# Patient Record
Sex: MALE | Race: WHITE | HISPANIC OR LATINO | URBAN - METROPOLITAN AREA
[De-identification: names, ages, dates, MRNs, and addresses within clinical notes are randomized per-mention and may not be internally consistent; named-entity substitution may affect disease eponyms.]

---

## 2022-07-13 ENCOUNTER — OUTPATIENT (OUTPATIENT)
Dept: OUTPATIENT SERVICES | Facility: HOSPITAL | Age: 27
LOS: 1 days | End: 2022-07-13
Payer: SELF-PAY

## 2022-07-13 DIAGNOSIS — Z20.828 CONTACT WITH AND (SUSPECTED) EXPOSURE TO OTHER VIRAL COMMUNICABLE DISEASES: ICD-10-CM

## 2022-07-13 PROCEDURE — 87635 SARS-COV-2 COVID-19 AMP PRB: CPT

## 2022-07-13 PROCEDURE — C9803: CPT

## 2022-07-14 LAB — SARS-COV-2 RNA SPEC QL NAA+PROBE: SIGNIFICANT CHANGE UP

## 2023-07-07 ENCOUNTER — EMERGENCY (EMERGENCY)
Facility: HOSPITAL | Age: 28
LOS: 1 days | Discharge: HOME | End: 2023-07-07
Attending: EMERGENCY MEDICINE
Payer: SELF-PAY

## 2023-07-07 VITALS
RESPIRATION RATE: 20 BRPM | OXYGEN SATURATION: 99 % | WEIGHT: 240.08 LBS | DIASTOLIC BLOOD PRESSURE: 90 MMHG | SYSTOLIC BLOOD PRESSURE: 163 MMHG | HEART RATE: 120 BPM | HEIGHT: 68 IN | TEMPERATURE: 98 F

## 2023-07-07 PROCEDURE — 90715 TDAP VACCINE 7 YRS/> IM: CPT

## 2023-07-07 PROCEDURE — 99284 EMERGENCY DEPT VISIT MOD MDM: CPT | Mod: 25

## 2023-07-07 PROCEDURE — 12001 RPR S/N/AX/GEN/TRNK 2.5CM/<: CPT

## 2023-07-07 PROCEDURE — 73130 X-RAY EXAM OF HAND: CPT | Mod: 26,LT

## 2023-07-07 PROCEDURE — 73130 X-RAY EXAM OF HAND: CPT

## 2023-07-07 PROCEDURE — T1013: CPT

## 2023-07-07 PROCEDURE — 96374 THER/PROPH/DIAG INJ IV PUSH: CPT | Mod: XU

## 2023-07-07 PROCEDURE — 90471 IMMUNIZATION ADMIN: CPT

## 2023-07-07 PROCEDURE — 99284 EMERGENCY DEPT VISIT MOD MDM: CPT

## 2023-07-07 RX ORDER — OXYCODONE AND ACETAMINOPHEN 5; 325 MG/1; MG/1
2 TABLET ORAL ONCE
Refills: 0 | Status: DISCONTINUED | OUTPATIENT
Start: 2023-07-07 | End: 2023-07-07

## 2023-07-07 RX ORDER — CEFAZOLIN SODIUM 1 G
2000 VIAL (EA) INJECTION ONCE
Refills: 0 | Status: DISCONTINUED | OUTPATIENT
Start: 2023-07-07 | End: 2023-07-07

## 2023-07-07 RX ORDER — CEPHALEXIN 500 MG
1 CAPSULE ORAL
Refills: 0
Start: 2023-07-07

## 2023-07-07 RX ORDER — CEFAZOLIN SODIUM 1 G
2000 VIAL (EA) INJECTION ONCE
Refills: 0 | Status: COMPLETED | OUTPATIENT
Start: 2023-07-07 | End: 2023-07-07

## 2023-07-07 RX ORDER — TETANUS TOXOID, REDUCED DIPHTHERIA TOXOID AND ACELLULAR PERTUSSIS VACCINE, ADSORBED 5; 2.5; 8; 8; 2.5 [IU]/.5ML; [IU]/.5ML; UG/.5ML; UG/.5ML; UG/.5ML
0.5 SUSPENSION INTRAMUSCULAR ONCE
Refills: 0 | Status: COMPLETED | OUTPATIENT
Start: 2023-07-07 | End: 2023-07-07

## 2023-07-07 RX ORDER — CEPHALEXIN 500 MG
1 CAPSULE ORAL
Qty: 14 | Refills: 0
Start: 2023-07-07 | End: 2023-07-13

## 2023-07-07 RX ADMIN — TETANUS TOXOID, REDUCED DIPHTHERIA TOXOID AND ACELLULAR PERTUSSIS VACCINE, ADSORBED 0.5 MILLILITER(S): 5; 2.5; 8; 8; 2.5 SUSPENSION INTRAMUSCULAR at 19:49

## 2023-07-07 RX ADMIN — OXYCODONE AND ACETAMINOPHEN 2 TABLET(S): 5; 325 TABLET ORAL at 19:48

## 2023-07-07 RX ADMIN — Medication 1 TABLET(S): at 20:48

## 2023-07-07 RX ADMIN — Medication 2000 MILLIGRAM(S): at 23:05

## 2023-07-07 NOTE — ED PROVIDER NOTE - OBJECTIVE STATEMENT
28 year old male with no med hx presented to ED c/o hand injury. pt was cutting with a circular saw, cut his hand and finger. He admits to pain, decrease in ROM to 1st digit. denies other injuries. unknown last tetanus. denies fever/chills, numbness, tingling, n/v/d.

## 2023-07-07 NOTE — ED PROVIDER NOTE - NSFOLLOWUPINSTRUCTIONS_ED_ALL_ED_FT
Follow up with hand within 1 week   - Please follow up with your Primary Care Doctor in 24-48 hr.  - Seek immediate medical care for any new, worsening or concerning signs or symptoms.   - Take medications as directed, be sure to read all instructions on packaging  - You were given copies of all your test results, please bring to your primary care doctor for review of any abnormal test results    Feel better!    Fracture    A fracture is a break in one of your bones. This can occur from a variety of injuries, especially traumatic ones. Symptoms include pain, bruising, or swelling. Do not use the injured limb. If a fracture is in one of the bones below your waist, do not put weight on that limb unless instructed to do so by your healthcare provider. Crutches or a cane may have been provided. A splint or cast may have been applied by your health care provider. Make sure to keep it dry and follow up with an orthopedist as instructed.    SEEK IMMEDIATE MEDICAL CARE IF YOU HAVE ANY OF THE FOLLOWING SYMPTOMS: numbness, tingling, increasing pain, or weakness in any part of the injured limb.    Laceration    A laceration is a cut that goes through all of the layers of the skin and into the tissue that is right under the skin. Some lacerations heal on their own. Others need to be closed with skin adhesive strips, skin glue, stitches (sutures), or staples. Proper laceration care minimizes the risk of infection and helps the laceration to heal better.  If non-absorbable stitches or staples have been placed, they must be taken out within the time frame instructed by your healthcare provider.    SEEK IMMEDIATE MEDICAL CARE IF YOU HAVE ANY OF THE FOLLOWING SYMPTOMS: swelling around the wound, worsening pain, drainage from the wound, red streaking going away from your wound, inability to move finger or toe near the laceration, or discoloration of skin near the laceration.        Fractura  Anders fractura es anders ruptura en juan f de milo huesos. [place] puede ocurrir por anders variedad de lesiones, especialmente las traumáticas. Los síntomas incluyen dolor, hematomas o hinchazón. No utilice la extremidad lesionada. Si tiene anders fractura en juan f de los huesos debajo de la cintura, no ponga peso sobre adelfo extremidad a menos que hernandez proveedor de atención médica se lo indique. Es posible que se hayan proporcionado muletas o un bastón. Es posible que hernandez proveedor de atención médica le haya aplicado anders férula o un yeso. Asegúrese de mantenerlo seco y pearl un seguimiento con un ortopedista según las instrucciones.  BUSQUE ATENCIÓN MÉDICA INMEDIATA SI TIENE ALGUNO DE LOS SIGUIENTES SÍNTOMAS: entumecimiento, hormigueo, aumento del dolor o debilidad en cualquier parte de la extremidad lesionada.  Laceración  Anders laceración es un mono que atraviesa todas las capas de la piel y llega al tejido que está jocelyne debajo de la piel. Algunas laceraciones se curan solas. Otros deben cerrarse con tiras adhesivas para la piel, pegamento para la piel, puntos (suturas) o grapas. El cuidado adecuado de la laceración minimiza el riesgo de infección y ayuda a que la laceración sane mejor. Si se murray colocado puntos o grapas no absorbibles, se deben retirar dentro del plazo indicado por hernandez proveedor de atención médica.  BUSQUE ATENCIÓN MÉDICA INMEDIATA SI TIENE ALGUNO DE LOS SIGUIENTES SÍNTOMAS: hinchazón alrededor de la herida, empeoramiento del dolor, drenaje de la herida, vetas aggarwal que desaparecen de la herida, incapacidad para  el dedo de la mano o del pie cerca de la laceración, o decoloración de la piel cerca de la herida. laceración.

## 2023-07-07 NOTE — ED PROVIDER NOTE - PROGRESS NOTE DETAILS
ortho called for + open fracture and extensive deep laceration with nerve injury - gary monroy repaired palm laceration 10 interrupted sutures - gary monroy

## 2023-07-07 NOTE — ED PROVIDER NOTE - CARE PROVIDER_API CALL
Karla Knowles  Orthopaedic Surgery  [address] [place] [address]  Phone: [phone]  Fax: [phone]  Follow Up Time:

## 2023-07-07 NOTE — ED ADULT NURSE NOTE - OBJECTIVE STATEMENT
Pt A&OX4.  PT able to ambulate.  PT stated that he was at work and cut his hand.  Pt noted with a laceration to the left thumb.  Bleeding controlled. PT medicated for pain as per orders.  Will continue to monitor.

## 2023-07-07 NOTE — ED PROVIDER NOTE - PATIENT PORTAL LINK FT
You can access the FollowMyHealth Patient Portal offered by [facility] by registering at the following website: [email or web address]. By joining [facility]’s FollowMyHealth portal, you will also be able to view your health information using other applications (apps) compatible with our system.

## 2023-07-07 NOTE — ED PROVIDER NOTE - CLINICAL SUMMARY MEDICAL DECISION MAKING FREE TEXT BOX
28 year old male with no med hx presented to ED c/o hand injury. pt with hand laceration x 2 with deformity to 1st left digit, + decrease in sensation to distal phalanx. xray, lac repair, abx and pain control     ortho called for open fracture,

## 2023-07-07 NOTE — ED ADULT TRIAGE NOTE - INTERNATIONAL TRAVEL
JAMES OU:  PRESCRIBED UV PROTECTION TO SLOW GROWTH. PRESCRIBE ARTIFICAL TEARS TO INCREASE COMFORT. No

## 2023-07-07 NOTE — ED PROVIDER NOTE - NS ED ROS FT
Constitutional: no fevers, chills  HEENT: no visual changes, no sore throat, no rhinorrhea  CV: no cp  Resp: no sob  GI: no abd pain, n/v, diarrhea/constipation  : no dysuria, hematuria  MSK: + left hand pain   skin: no rashes  neuro: no HA, no confusion  psych: no SI/HI

## 2023-07-07 NOTE — ED PROVIDER NOTE - ATTENDING APP SHARED VISIT CONTRIBUTION OF CARE
Eugenia FORD- 28-year-old male with no medical problems presents with left hand lacerations when he was at work and using a saw.  Patient is right-handed and last tetanus unknown no other injuries    Sister is the , refused professional     Patient is alert well-appearing male, holding his left hand and pain, S1-S2 is normal regular, bilateral clear breath sounds, abdomen is soft, neuro exam alert oriented x3 no focal deficits, skin warm dry, good turgor, Left hand 2 lacerations noted on the ventral aspect 3 cm laceration on the left thumb mid aspect and 3 cm laceration on the ventral hypothenar aspect.  No profuse bleeding or arterial bleeding patient has full range of motion of the left hand and is able to flex his left thumb    Plan to supplement tetanus shot do the x-ray to rule out any acute fractures, will repair the laceration and then keep him on at p.o. antibiotics as it is a dirty wound.

## 2023-07-07 NOTE — ED ADULT NURSE NOTE - NSFALLUNIVINTERV_ED_ALL_ED
Assistance OOB with selected safe patient handling equipment if applicable/Bed/Stretcher in lowest position, wheels locked, appropriate side rails in place/Call bell, personal items and telephone in reach/Instruct patient to call for assistance before getting out of bed/chair/stretcher/Non-slip footwear applied when patient is off stretcher/[place] to call system/Physically safe environment - no spills, clutter or unnecessary equipment/Purposeful proactive rounding/Room/bathroom lighting operational, light cord in reach

## 2023-07-07 NOTE — ED PROVIDER NOTE - PHYSICAL EXAMINATION
Physical Examination:   Gen: NAD, AOx3  Head: NCAT  HEENT: EOMI, oral mucosa moist, normal conjunctiva, neck supple  Lung: no respiratory distress  CV:  Normal perfusion  Abd: soft,  nontender ND  MSK: No edema, + circumferential laceration to left 1st digit, mild decrease strength to distal fingertip. bleeding controlled. + 4mm laceration to palm of hand FROM motor  intact throughout other digits   Neuro: No focal neurologic deficits  Skin: No rash   Psych: normal affect

## 2023-07-08 NOTE — CONSULT NOTE ADULT - SUBJECTIVE AND OBJECTIVE BOX
ORTHO-HAND SERVICE    Pt Name: HALIE HAWTHORNE    MRN: [id number]    ED  was utilized for this encounter    Patient is a 28y Male presenting to the emergency department with a chief complaint of left hand pain. Patient states that he was using a circular saw at work, slipped and cut his left thumb and palm of left hand with the saw. Patient complains of altered sensation to tip of left thumb.       REVIEW OF SYSTEMS    General: Alert, responsive, in NAD    Skin/Breast: No rashes, no pruritis   	  Ophthalmologic: No visual changes. No redness.   	  ENMT:	No discharge. No swelling.    Respiratory and Thorax: No difficulty breathing. No cough.  	   Cardiovascular: No chest pain. No palpitations.    Gastrointestinal: No abdominal pain. No diarrhea.     Genitourinary: No dysuria. No bleeding.    Musculoskeletal: SEE HPI.    Neurological: No sensory or motor changes.     Psychiatric: No anxiety or depression.    Hematology/Lymphatics: No swelling.    Endocrine: No Hx of diabetes.    ROS is otherwise negative.    PAST MEDICAL & SURGICAL HISTORY:  PAST MEDICAL & SURGICAL HISTORY:      Allergies: No Known Allergies      Medications:     FAMILY HISTORY:  : non-contributory    Social History:       Daily Height in cm: 172.72 (07 Jul 2023 18:47)    Daily                     PHYSICAL EXAM:      Appearance: Alert, responsive, in no acute distress.    Neurological: Sensation is grossly intact to light touch. 5/5 motor function of all extremities. No focal deficits or weaknesses found.    Skin: no rash on visible skin. Skin is clean, dry and intact. No bleeding. No abrasions. No ulcerations.    Vascular: 2+ distal pulses. Cap refill < 2 sec. No signs of venous  insufficiency  or stasis. No extremity ulcerations. No cyanosis.    Musculoskeletal:         Left Upper Extremity:       Right Upper Extremity:           Imaging Studies:    A/P:  Pt is a  28y Male with    found to have    PLAN:   * NPO for OR tomorrow  * IV fluids ordered and to start once NPO  * Pre-operative ABX ordered  *Routine daily anticoagulation held for OR  * Single dose anticoaguation ordered  * Medical clearance requested for procedure  * Bed rest ORTHO-HAND SERVICE    Pt Name: HALIE HAWTHORNE    MRN: [id number]    Patient is a 28y Male presenting to the emergency department with a chief complaint of left hand pain. Patient states that he was using a circular saw at work, slipped and cut his left thumb and palm of left hand with the saw. Patient complains of altered sensation to tip of left thumb. Patient states that he was able to move all fingers     REVIEW OF SYSTEMS    General: Alert, responsive, in NAD    Skin/Breast: + laceration to left palm and thumb     Musculoskeletal: SEE HPI.    Neurological: altered sensation to tip of left thumb        Allergies: No Known Allergies      PHYSICAL EXAM:      Appearance: Alert, responsive, in no acute distress.    Neurological: Sensation is grossly intact to light touch. No focal deficits or weaknesses found. Altered sensation to left thumb see below.    Skin: laceration to left palm and left thumb see below for details, otherwise no rash on visible skin. Skin is clean, dry and intact. No bleeding. No abrasions. No ulcerations.    Vascular: 2+ distal pulses. Brisk cap refill to all digits of left hand     Musculoskeletal:         Left Upper Extremity: Left hand - Approximately 4cm laceration on ulnar side of left palm (already sutured by ER team at time of physical exam), Circumferential laceration to distal aspect of left thumb beginning at volar aspect of left thumb distal to IP joint extending radially to middle of volar surface of left thumb just proximal to nail plate. Nail plate intact.              Imaging Studies:    A/P:  Pt is a  28y Male with    found to have    PLAN:                            ORTHO-HAND SERVICE    Pt Name: HALIE HAWTHORNE    MRN: [id number]    Patient is a 28y Male presenting to the emergency department with a chief complaint of left hand pain. Patient states that he was using a circular saw at work, slipped and cut his left thumb and palm of left hand with the saw. Patient complains of altered sensation to tip of left thumb. Patient states that he was able to move all fingers     REVIEW OF SYSTEMS    General: Alert, responsive, in NAD    Skin/Breast: + laceration to left palm and thumb     Musculoskeletal: SEE HPI.    Neurological: altered sensation to tip of left thumb        Allergies: No Known Allergies      PHYSICAL EXAM:      Appearance: Alert, responsive, in no acute distress.    Neurological: Sensation is grossly intact to light touch. No focal deficits or weaknesses found. Altered sensation to left thumb see below.    Skin: laceration to left palm and left thumb see below for details, otherwise no rash on visible skin. Skin is clean, dry and intact. No bleeding. No abrasions. No ulcerations.    Vascular: 2+ distal pulses. Brisk cap refill to all digits of left hand     Musculoskeletal:         Left Upper Extremity: Left hand - Approximately 4cm laceration on ulnar side of left palm (already sutured by ER team at time of physical exam), Full flexion and extension of digits 2-5 left hand, normal sensation to digits 2-5, +2 radial pulse, FROM of left wrist    Left thumb: Circumferential laceration to distal aspect of left thumb beginning at middle portion of volar aspect of left thumb distal to IP joint extending radially to middle of volar surface of left thumb just proximal to nail plate with fat exposed. Nail plate intact. Normal sensation left thumb proximal to IP joint. Minimal sensation to radial aspect of distal portion of left thumb. Ulnar aspect of left thumb distal to IP intact but altered. Flexion and extension intact to distal aspect of thumb although ROM limited due to swelling and pain. +FPL intact on exam. FROM at MP joint of left thumb. Brisk capillary refill to distal aspect of left thumb.      Imaging Studies: XR of left hand reviewed preliminary reveals left thumb distal phalanx fracture     .procedure-I&D    A/P:  Pt is a  28y Male found to have laceration to palm of left hand, laceration to distal portion of left thumb and left thumb distal phalanx fracture. Possible left thumb radial digit nerve injury, no obvious tendon involvement     PLAN:   - Pain control   -                          ORTHO-HAND SERVICE    Pt Name: HALIE HAWTHORNE    MRN: [id number]    Patient is a 28y Male presenting to the emergency department with a chief complaint of left hand pain. Patient states that he was using a circular saw at work, slipped and cut his left thumb and palm of left hand with the saw. Patient complains of altered sensation to tip of left thumb. Patient states that he was able to move all fingers     REVIEW OF SYSTEMS    General: Alert, responsive, in NAD    Skin/Breast: + laceration to left palm and thumb     Musculoskeletal: SEE HPI.    Neurological: altered sensation to tip of left thumb        Allergies: No Known Allergies      PHYSICAL EXAM:      Appearance: Alert, responsive, in no acute distress.    Neurological: Sensation is grossly intact to light touch. No focal deficits or weaknesses found. Altered sensation to left thumb see below.    Skin: laceration to left palm and left thumb see below for details, otherwise no rash on visible skin. Skin is clean, dry and intact. No bleeding. No abrasions. No ulcerations.    Vascular: 2+ distal pulses. Brisk cap refill to all digits of left hand     Musculoskeletal:         Left Upper Extremity: Left hand - Approximately 4cm laceration on ulnar side of left palm (already sutured by ER team at time of physical exam), Full flexion and extension of digits 2-5 left hand, normal sensation to digits 2-5, +2 radial pulse, FROM of left wrist    Left thumb: Circumferential laceration to distal aspect of left thumb beginning at middle portion of volar aspect of left thumb distal to IP joint extending radially to middle of volar surface of left thumb just proximal to nail plate with fat exposed. Nail plate intact. Normal sensation left thumb proximal to IP joint. Minimal sensation to radial aspect of distal portion of left thumb. Ulnar aspect of left thumb distal to IP intact but altered. Flexion and extension intact to distal aspect of thumb although ROM limited due to swelling and pain. +FPL intact on exam. FROM at MP joint of left thumb. Brisk capillary refill to distal aspect of left thumb.      Imaging Studies: XR of left hand reviewed preliminary reveals left thumb distal phalanx fracture     PROCEDURE NOTE: Left thumb incision and drainage/bedside washout, closure of laceration, splint application  ED  was utilized to obtain signed [place] consent    Performed by: Mahin Keating PA-C    Indication: Left thumb laceration with distal phalanx fracture    Consent: the risks and benefits of incision and drainage discussed with patient, including the risk of bleeding, infection, nerve damage, blood vessel damage and technical failure.  The alternatives of performing the procedure also discussed. Written consent was obtained following the discussion. The left thumb was irrigated copiously with betadine and saline.     The affected site was prepped and draped in proper sterile fashion. Left thumb digit nerve block performed with [ 15 ] ml of 1% lidocaine. 5-0 Chromic absorbable sutures used to repair laceration. xeroform placed over repaired laceration site. A dry sterile dressing was applied to site. The site was bandaged and no complications were noted. The patient tolerated the procedure well.    The left thumb was appropriately positioned. A thumb Alumafoam splint applied to left thumb on volar surface. The patient tolerated the procedure well.  Following procedure patient sensation was consistent with original exam seen above. Brisk capillary refill present to tip of left thumb.      A/P:  Pt is a  28y Male found to have laceration to palm of left hand, laceration to distal portion of left thumb and left thumb distal phalanx fracture. Possible left thumb radial digit nerve injury, no obvious tendon involvement     PLAN:   - Pain control   - tetanus given in ED  - Ancef 2g IV x 1 given in ED  - No surgical intervention at this time   - NWB left hand, NO ROM left thumb, ROM of remaining digits at tolerated  - Keep dressing in place until follow up  - Recommend Keflex PO on discharge   - F/U with Dr. Knowles on 7/10/2023  - Discussed with Dr. Knowles ortho attending

## 2023-07-08 NOTE — CONSULT NOTE ADULT - REASON FOR ADMISSION
Patient is a 28y Male presenting to the emergency department with a chief complaint of left hand pain. Patient states that he was using a circular saw at work, slipped and cut his left thumb and palm of left hand with the saw. Patient complains of altered sensation to tip of left thumb. Patient states that he was able to move all fingers including left thumb immediately after time of accident. No other orthopedic complaints.